# Patient Record
Sex: MALE | Race: WHITE | NOT HISPANIC OR LATINO | URBAN - METROPOLITAN AREA
[De-identification: names, ages, dates, MRNs, and addresses within clinical notes are randomized per-mention and may not be internally consistent; named-entity substitution may affect disease eponyms.]

---

## 2020-12-28 ENCOUNTER — TELEPHONE (OUTPATIENT)
Dept: ENDOCRINOLOGY | Facility: CLINIC | Age: 20
End: 2020-12-28

## 2020-12-28 NOTE — TELEPHONE ENCOUNTER
PT'S MOTHER CALLED STATING THAT THIS PT HAD LABWORK DONE AND SHE WOULD LIKE TO SPEAK W/ SOMEONE IF POSSIBLE. PLEASE CONTACT PT AT EARLIEST CONVENIECE. THANK YOU

## 2020-12-29 NOTE — TELEPHONE ENCOUNTER
Called the mother and she stated the patient had labs done at [facility] in [place]. Will call bernadine Collazo at [facility]. The following are the hours Tuesday 3-6PM  Wednesday 8:30-11AM, 3-6PM phone number is [phone]. Patient was last seen on 8-31-20    Attempted to contact office there was no answer.

## 2020-12-30 NOTE — TELEPHONE ENCOUNTER
Spoke with Calista and was going to have someone in the office fax labs over. She also stated the mother could put them from test to e-mail, print and send to us. They had not received a call for a copy of labs from mother. Message left to return call.

## 2020-12-31 NOTE — TELEPHONE ENCOUNTER
Mother returned call and was given message  From Chiro. She is going to call and get labs or try to get MY chart.

## 2020-12-31 NOTE — TELEPHONE ENCOUNTER
Spoke with pt's mom.  He is having fatigue.  Having trouble sleeping.  He is having issues with ED.  They are wondering if the celexa could be contributing to this.  He had labs done by the chiropractor.  They faxed them over.  They were concerned about adrenal function.  However, the levels we received seemed normal.  I encouraged her to call the office to schedule a sooner appt.

## 2021-01-11 RX ORDER — LEVOTHYROXINE SODIUM 0.03 MG/1
TABLET ORAL
Qty: 30 TABLET | Refills: 4 | Status: SHIPPED | OUTPATIENT
Start: 2021-01-11 | End: 2021-04-12

## 2021-04-12 ENCOUNTER — OFFICE VISIT (OUTPATIENT)
Dept: ENDOCRINOLOGY | Facility: CLINIC | Age: 21
End: 2021-04-12

## 2021-04-12 VITALS
TEMPERATURE: 98.2 F | BODY MASS INDEX: 27.28 KG/M2 | WEIGHT: 180 LBS | DIASTOLIC BLOOD PRESSURE: 72 MMHG | SYSTOLIC BLOOD PRESSURE: 110 MMHG | HEART RATE: 55 BPM | HEIGHT: 68 IN | OXYGEN SATURATION: 100 %

## 2021-04-12 DIAGNOSIS — E06.3 HASHIMOTO'S THYROIDITIS: Primary | ICD-10-CM

## 2021-04-12 DIAGNOSIS — R53.82 CHRONIC FATIGUE: ICD-10-CM

## 2021-04-12 DIAGNOSIS — R74.8 ELEVATED LIVER ENZYMES: ICD-10-CM

## 2021-04-12 PROCEDURE — 99214 OFFICE O/P EST MOD 30 MIN: CPT | Performed by: PHYSICIAN ASSISTANT

## 2021-04-12 RX ORDER — LEVOTHYROXINE SODIUM 0.03 MG/1
25 TABLET ORAL DAILY
Qty: 30 TABLET | Refills: 4
Start: 2021-04-12 | End: 2021-07-13

## 2021-04-12 NOTE — PROGRESS NOTES
Office Note      Date: 2021  Patient Name: Maximiliano Mclaughlin  MRN: [id number]  : 2000    Chief Complaint   Patient presents with   • Thyroid Problem       History of Present Illness:   Maximiliano Mclaughlin is a 20 y.o. male who presents today for Hashimoto's thyroiditis and fatigue.   He continues to note fatigue all the time.  He remains on levothyroxine 25 mcg daily.  Since he had labs done for our office back in the fall and his bilirubin was mildly elevated these have been checked several times and he is seeing the liver doctor.  His liver enzymes have fluctuated and they are planning to do a liver ultrasound tomorrow.  He stopped the protein supplements he was taking for weight lifting he seemed to improve some but his mom is wondering if he could try stopping the levothyroxine to see if this helps.  They also continue to worry about his testosterone levels and were asking if these could be checked again.  His mom is also asking about adrenal fatigue today.  He denies any alcohol or drug use other than what is been prescribed in the routine supplements he was taking for weight lifting.  He also complains of anxiety trouble sleeping and tremors at times.  His mom reports did have a positive Monospot test over the winter and they are wondering if this could be one of the causes of his fatigue.    Subjective      Review of Systems:  Review of Systems   Constitutional: Positive for fatigue.   HENT: Positive for sinus pressure.    Eyes: Positive for photophobia.   Endocrine: Positive for heat intolerance.   Musculoskeletal: Positive for arthralgias and back pain.   Allergic/Immunologic: Positive for environmental allergies and food allergies.   Neurological: Positive for tremors and light-headedness.   Psychiatric/Behavioral: Positive for sleep disturbance. The patient is nervous/anxious.        The following portions of the patient's history were reviewed and updated as appropriate: allergies,  "current medications, past family history, past medical history, past social history, past surgical history and problem list.    Objective     Vitals:    04/12/21 1323   BP: 110/72   BP Location: Right arm   Patient Position: Sitting   Cuff Size: Adult   Pulse: 55   Temp: 98.2 °F (36.8 °C)   TempSrc: Infrared   SpO2: 100%   Weight: 81.6 kg (180 lb)   Height: 172.7 cm (68\")   PainSc: 0-No pain     Body mass index is 27.37 kg/m².    Physical Exam  Vitals reviewed.   Constitutional:       General: He is not in acute distress.     Appearance: Normal appearance.      Comments: With his mom today   Neck:      Thyroid: No thyroid mass, thyromegaly or thyroid tenderness.   Lymphadenopathy:      Cervical: No cervical adenopathy.   Neurological:      Mental Status: He is alert.           Assessment / Plan      Assessment & Plan:  1. Hashimoto's thyroiditis  Discussed the reasons we started the levothyroxine in the first place.  Patient's thyroid hormone levels were normal but he had positive antibodies and he initially felt better taking the levothyroxine.  Advised patient and his mom that it was okay to stop the levothyroxine and recheck thyroid levels in about 6 weeks for monitoring off the medication.  Gave patient lab slip to have these checked and will send note with results and plan.  - T4, Free; Future  - TSH; Future    2. Chronic fatigue  He continues to complain of fatigue.  In 6 weeks when he goes to have the thyroid levels checked will have his free and total testosterone rechecked and a fasting 8 AM cortisol checked at that time.  Will send note with results and plan.  Patient's mom asked about checking Ventura-Barr titers today suggested she discuss this with the primary care physician.  - Testosterone, Free, Total; Future  - Cortisol; Future    3.  Elevated Liver Enzymes    He is now seeing gastroenterologist and has a liver ultrasound scheduled for tomorrow.  Suggested they follow-up with them regarding the " liver enzymes.  Discussed multiple reasons for elevated liver enzymes with patient and his mom, and suggested they continue to see the gastroenterologist.  Return in about 3 months (around 7/12/2021) for Recheck 3-4 mos with Dr. Reddy.    RAJESH Negrete   04/12/2021

## 2021-04-22 ENCOUNTER — TELEPHONE (OUTPATIENT)
Dept: ENDOCRINOLOGY | Facility: CLINIC | Age: 21
End: 2021-04-22

## 2021-04-22 NOTE — TELEPHONE ENCOUNTER
PT MOTHER STATED PT WENT TO HIS PCP BECAUSE PT IS EXTREMELY FATIGUED, PTS MOTHER WOULD LIKE TO GET A PRESCRIPTION FOR ANTIBIOTICS SENT TO THEIR PHARMACY AND SHE WOULD LIKE TO SPEAK WITH SOMEONE ON THE CLINICAL STAFF, PLEASE ADVISE.

## 2021-04-23 NOTE — TELEPHONE ENCOUNTER
Spoke with patients mother.  States they are going to send us lab results.  She is going to contact PCP

## 2021-07-12 ENCOUNTER — TELEPHONE (OUTPATIENT)
Dept: ENDOCRINOLOGY | Facility: CLINIC | Age: 21
End: 2021-07-12

## 2021-07-12 NOTE — TELEPHONE ENCOUNTER
Patient's mother called and said they don't have the cortisol and the testosterone labs done and wants to know if they should reschedule tomorrow's appt until they have the results. Please advise.

## 2021-07-13 ENCOUNTER — TELEPHONE (OUTPATIENT)
Dept: ENDOCRINOLOGY | Facility: CLINIC | Age: 21
End: 2021-07-13

## 2021-07-13 ENCOUNTER — OFFICE VISIT (OUTPATIENT)
Dept: ENDOCRINOLOGY | Facility: CLINIC | Age: 21
End: 2021-07-13

## 2021-07-13 VITALS
DIASTOLIC BLOOD PRESSURE: 68 MMHG | HEIGHT: 68 IN | HEART RATE: 50 BPM | BODY MASS INDEX: 27.74 KG/M2 | OXYGEN SATURATION: 100 % | WEIGHT: 183 LBS | SYSTOLIC BLOOD PRESSURE: 100 MMHG

## 2021-07-13 DIAGNOSIS — R53.82 CHRONIC FATIGUE: ICD-10-CM

## 2021-07-13 DIAGNOSIS — E06.3 HASHIMOTO'S THYROIDITIS: Primary | ICD-10-CM

## 2021-07-13 PROCEDURE — 99213 OFFICE O/P EST LOW 20 MIN: CPT | Performed by: PHYSICIAN ASSISTANT

## 2021-07-13 NOTE — PROGRESS NOTES
"     Office Note      Date: 2021  Patient Name: Maximiliano Mclaughlin  MRN: [id number]  : 2000    Chief Complaint   Patient presents with   • Hashimoto's Thyroiditis       History of Present Illness:   Maximiliano Mclaughlin is a 20 y.o. male who presents today for Hashimoto's thyroiditis.  At last appointment we tried stopping his levothyroxine 25 mcg daily to see how he felt.  He reports he has felt well.  He has had more energy in the last several months than he has in a while.  He reports he is sleeping better and is able to be more active.  He is off his anxiety medication which she feels has helped.  He reports he had labs pulled recently for the liver doctor and they checked his thyroid levels but his testosterone and cortisol levels were not checked.  He showed me his thyroid levels on his phone and his TSH was high normal.  He reports he had an ultrasound of the liver and this all looked okay.  He has been working with his cousin this summer building porches and doing yard work.    Subjective      Review of Systems:  Review of Systems   Constitutional: Negative.    Cardiovascular: Negative.    Gastrointestinal: Negative.    Endocrine: Negative.    Neurological: Negative.        The following portions of the patient's history were reviewed and updated as appropriate: allergies, current medications, past family history, past medical history, past social history, past surgical history and problem list.    Objective     Vitals:    21 0836   BP: 100/68   BP Location: Left arm   Patient Position: Sitting   Cuff Size: Adult   Pulse: 50   SpO2: 100%   Weight: 83 kg (183 lb)   Height: 172.7 cm (68\")   PainSc: 0-No pain     Body mass index is 27.83 kg/m².    Physical Exam  Vitals reviewed.   Constitutional:       General: He is not in acute distress.     Appearance: Normal appearance.   Neck:      Thyroid: No thyroid mass, thyromegaly or thyroid tenderness.   Lymphadenopathy:      Cervical: No cervical " adenopathy.   Neurological:      Mental Status: He is alert.           Assessment / Plan      Assessment & Plan:  1. Hashimoto's thyroiditis  His recent TFTs off the levothyroxine were within normal limits.  He has felt well.  We will continue to keep him off the levothyroxine.  Discussed the importance of checking his thyroid levels regularly for monitoring.  We will try to track down a copy of the lab report from last week.  He would still like to have the testosterone levels and fasting cortisol level checked.  Have him a new order to have these checked in the next few weeks for monitoring.  Will send note with results and plan.  I encouraged him to contact me if he does not hear from me with these results within 2 weeks of having the blood drawn to be sure we received the results.  - Testosterone, Free, Total; Future  - Cortisol; Future    2. Chronic fatigue  His fatigue has improved significantly.  He would still like to have his testosterone levels and cortisol level checked.  They have him an order have this done in the next few weeks for monitoring.  Will send note with results and plan.  If his labs come back normal we will plan to follow-up with him in 6 months for monitoring  unless anything comes up in the interim.  Patient to call as needed  - Testosterone, Free, Total; Future  - Cortisol; Future       Return in about 6 months (around 1/13/2022) for Recheck sees me or Dr. Reddy.    This note was dictated using Dragon voice recognition.    RAJESH Negrete   07/13/2021

## 2021-08-02 ENCOUNTER — TELEPHONE (OUTPATIENT)
Dept: ENDOCRINOLOGY | Facility: CLINIC | Age: 21
End: 2021-08-02

## 2022-01-13 ENCOUNTER — OFFICE VISIT (OUTPATIENT)
Dept: ENDOCRINOLOGY | Facility: CLINIC | Age: 22
End: 2022-01-13

## 2022-01-13 ENCOUNTER — TELEPHONE (OUTPATIENT)
Dept: ENDOCRINOLOGY | Facility: CLINIC | Age: 22
End: 2022-01-13

## 2022-01-13 VITALS — BODY MASS INDEX: 27.4 KG/M2 | HEIGHT: 69 IN | WEIGHT: 185 LBS

## 2022-01-13 DIAGNOSIS — E06.3 HASHIMOTO'S THYROIDITIS: Primary | ICD-10-CM

## 2022-01-13 PROCEDURE — 99212 OFFICE O/P EST SF 10 MIN: CPT | Performed by: PHYSICIAN ASSISTANT

## 2022-01-13 NOTE — PROGRESS NOTES
"     Office Note      Date: 2022  Patient Name: Maximiliano Mclaughlin  MRN: [id number]  : 2000    Chief Complaint   Patient presents with   • Hashimoto's Thyroiditis       History of Present Illness:   Maximiliano Mclaughlin is a 21 y.o. male who presents today for follow-up for chronic autoimmune thyroiditis.  He consented for the visit to be conducted over the telephone today.  Both parties were in the [facility].    Start time 8:31 AM   End time 8:39 AM   He remains off the levothyroxine and reports overall he continues to feel well.  He has felt better off the levothyroxine that he felt when he was on the medication.  He currently has COVID and was diagnosed on Saturday he has a few more days of quarantine but is feeling better.  He had a checkup with his primary care physician  and had blood work drawn.  He reports his TSH at this time was 4.34 (0.45-4.5).  He denies any changes in his neck today.  His testosterone and 8 AM cortisol level were within normal limits after his last appointment.    Subjective      Review of Systems:  Review of Systems   Constitutional: Negative.    Cardiovascular: Negative.    Gastrointestinal: Negative.    Endocrine: Negative.    Neurological: Negative.        The following portions of the patient's history were reviewed and updated as appropriate: allergies, current medications, past family history, past medical history, past social history, past surgical history and problem list.    Objective     Vitals:    22 0826   Weight: 83.9 kg (185 lb)   Height: 175.3 cm (69\")     Body mass index is 27.32 kg/m².    Physical Exam        Assessment / Plan      Assessment & Plan:  1. Hashimoto's thyroiditis  He had a recent TSH checked on  that was within normal limits.  We discussed that the fact that the TSH is creeping up may indicate that he could become hypothyroid but he does not require any medication at this time.  He prefers to stay off the " medication if possible since he has felt better off this.  I will try to get a copy of the lab results from his primary care physician.  For now we will plan on adding no medication and he will follow-up in about 6 months for monitoring unless anything changes in the interim.  We discussed seeing his primary care physician for his thyroid levels but he prefers to follow-up here.  Patient to call as needed.       Return in about 6 months (around 7/13/2022) for Recheck pt will call to schedule appointment.     This note was dictated using Dragon voice recognition.    RAJESH Negrete   01/13/2022

## 2022-06-21 ENCOUNTER — TELEPHONE (OUTPATIENT)
Dept: ENDOCRINOLOGY | Facility: CLINIC | Age: 22
End: 2022-06-21

## 2022-06-21 DIAGNOSIS — E06.3 HASHIMOTO'S THYROIDITIS: Primary | ICD-10-CM

## 2022-07-28 ENCOUNTER — TELEPHONE (OUTPATIENT)
Dept: ENDOCRINOLOGY | Facility: CLINIC | Age: 22
End: 2022-07-28

## 2022-08-03 ENCOUNTER — LAB (OUTPATIENT)
Dept: LAB | Facility: HOSPITAL | Age: 22
End: 2022-08-03

## 2022-08-03 ENCOUNTER — OFFICE VISIT (OUTPATIENT)
Dept: ENDOCRINOLOGY | Facility: CLINIC | Age: 22
End: 2022-08-03

## 2022-08-03 VITALS
DIASTOLIC BLOOD PRESSURE: 74 MMHG | HEIGHT: 69 IN | BODY MASS INDEX: 27.7 KG/M2 | OXYGEN SATURATION: 98 % | SYSTOLIC BLOOD PRESSURE: 108 MMHG | WEIGHT: 187 LBS | HEART RATE: 62 BPM

## 2022-08-03 DIAGNOSIS — E06.3 HASHIMOTO'S THYROIDITIS: Primary | ICD-10-CM

## 2022-08-03 DIAGNOSIS — E06.3 HASHIMOTO'S THYROIDITIS: ICD-10-CM

## 2022-08-03 LAB
T4 FREE SERPL-MCNC: 1.21 NG/DL (ref 0.93–1.7)
TSH SERPL DL<=0.05 MIU/L-ACNC: 3.31 UIU/ML (ref 0.27–4.2)

## 2022-08-03 PROCEDURE — 84403 ASSAY OF TOTAL TESTOSTERONE: CPT

## 2022-08-03 PROCEDURE — 84439 ASSAY OF FREE THYROXINE: CPT

## 2022-08-03 PROCEDURE — 84402 ASSAY OF FREE TESTOSTERONE: CPT

## 2022-08-03 PROCEDURE — 99213 OFFICE O/P EST LOW 20 MIN: CPT | Performed by: INTERNAL MEDICINE

## 2022-08-03 PROCEDURE — 84443 ASSAY THYROID STIM HORMONE: CPT

## 2022-08-03 NOTE — PROGRESS NOTES
"     Office Note      Date: 2022  Patient Name: Maximiliano Mclaughlin  MRN: [id number]  : 2000    Chief Complaint   Patient presents with   • Hashimoto's Thyroiditis       History of Present Illness:   Maximiliano Mclaughlin is a 21 y.o. male who presents for Hashimoto's Thyroiditis    He has h/o Hashimoto's thyroiditis.  He was on T4 treatment but felt worse while taking it.  More recently, TSH levels have been normal off T4 tx.  He hasn't noted any change in the size of his neck.  He denies any compressive sxs.  He c/o fatigue.  He denies any other sxs of hypo- or hyperthyroidism at this time.  He asks if we can check testosterone levels.  He had a mild case of COVID-19 infection since his last visit.      Subjective      Review of Systems:   Review of Systems   Constitutional: Positive for fatigue.   Cardiovascular: Negative.    Gastrointestinal: Negative.    Endocrine: Negative.        The following portions of the patient's history were reviewed and updated as appropriate: allergies, current medications, past family history, past medical history, past social history, past surgical history and problem list.    Objective     Visit Vitals  /74   Pulse 62   Ht 175.3 cm (69\")   Wt 84.8 kg (187 lb)   SpO2 98%   BMI 27.62 kg/m²       Physical Exam:  Physical Exam  Constitutional:       Appearance: Normal appearance.   Neck:      Thyroid: No thyroid mass, thyromegaly or thyroid tenderness.   Lymphadenopathy:      Cervical: No cervical adenopathy.   Neurological:      Mental Status: He is alert.         Labs:    TSH  No results found for: TSHBASE     Free T4  No results found for: FREET4    T3  No results found for: X3YPTVS      TPO  No results found for: THYROIDAB    TG AB  No results found for: THGAB    TG  No results found for: THYROGLB    CBC w/DIFF  No results found for: WBC, RBC, HGB, HCT, MCV, MCH, MCHC, RDW, RDWSD, MPV, PLT, NEUTRORELPCT, LYMPHORELPCT, MONORELPCT, EOSRELPCT, BASORELPCT, AUTOIGPER, " NEUTROABS, LYMPHSABS, MONOSABS, EOSABS, BASOSABS, AUTOIGNUM, NRBC        Assessment / Plan      Assessment & Plan:  Diagnoses and all orders for this visit:    1. Hashimoto's thyroiditis (Primary)  Assessment & Plan:  No goiter on exam today.  Appears euthyroid clinically.  I would like to check labs today.    Orders:  -     TSH; Future  -     T4, Free; Future  -     Testosterone Free MS / Dialysis; Future      Return in about 1 year (around 8/3/2023) for Recheck with TSH.    Segundo Reddy MD   08/03/2022

## 2022-08-08 LAB
TESTOST FREE MFR SERPL: 0.9 %
TESTOST FREE SERPL-MCNC: 40 PG/ML
TESTOST SERPL-MCNC: 447 NG/DL